# Patient Record
Sex: FEMALE
[De-identification: names, ages, dates, MRNs, and addresses within clinical notes are randomized per-mention and may not be internally consistent; named-entity substitution may affect disease eponyms.]

---

## 2018-05-31 ENCOUNTER — HOSPITAL ENCOUNTER (OUTPATIENT)
Dept: HOSPITAL 31 - C.ER | Age: 43
Setting detail: OBSERVATION
LOS: 1 days | Discharge: HOME | End: 2018-06-01
Attending: INTERNAL MEDICINE | Admitting: INTERNAL MEDICINE
Payer: COMMERCIAL

## 2018-05-31 VITALS — BODY MASS INDEX: 26.6 KG/M2

## 2018-05-31 DIAGNOSIS — K57.30: Primary | ICD-10-CM

## 2018-05-31 DIAGNOSIS — K59.00: ICD-10-CM
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
## 2018-05-31 DIAGNOSIS — G43.909: ICD-10-CM

## 2018-05-31 DIAGNOSIS — D12.4: ICD-10-CM

## 2018-05-31 DIAGNOSIS — K64.8: ICD-10-CM

## 2018-05-31 DIAGNOSIS — R20.0: ICD-10-CM

## 2018-05-31 DIAGNOSIS — Z87.11: ICD-10-CM

## 2018-05-31 DIAGNOSIS — T50.995A: ICD-10-CM

## 2018-05-31 DIAGNOSIS — R19.7: ICD-10-CM

## 2018-05-31 DIAGNOSIS — R63.4: ICD-10-CM

## 2018-05-31 DIAGNOSIS — R73.9: ICD-10-CM

## 2018-05-31 DIAGNOSIS — D12.8: ICD-10-CM

## 2018-05-31 DIAGNOSIS — Z98.51: ICD-10-CM

## 2018-05-31 DIAGNOSIS — K64.4: ICD-10-CM

## 2018-05-31 LAB
ALBUMIN SERPL-MCNC: 4.9 G/DL (ref 3.5–5)
ALBUMIN/GLOB SERPL: 1.4 {RATIO} (ref 1–2.1)
ALT SERPL-CCNC: 47 U/L (ref 9–52)
APTT BLD: 30 SECONDS (ref 21–34)
AST SERPL-CCNC: 33 U/L (ref 14–36)
BACTERIA #/AREA URNS HPF: (no result) /[HPF]
BASOPHILS # BLD AUTO: 0 K/UL (ref 0–0.2)
BASOPHILS NFR BLD: 0.2 % (ref 0–2)
BILIRUB UR-MCNC: NEGATIVE MG/DL
BUN SERPL-MCNC: 8 MG/DL (ref 7–17)
CALCIUM SERPL-MCNC: 9.3 MG/DL (ref 8.6–10.4)
CK MB SERPL-MCNC: < 0.22 NG/ML (ref 0–3.38)
EOSINOPHIL # BLD AUTO: 0 K/UL (ref 0–0.7)
EOSINOPHIL NFR BLD: 0.5 % (ref 0–4)
ERYTHROCYTE [DISTWIDTH] IN BLOOD BY AUTOMATED COUNT: 14.5 % (ref 11.5–14.5)
GFR NON-AFRICAN AMERICAN: > 60
GLUCOSE UR STRIP-MCNC: NORMAL MG/DL
HGB BLD-MCNC: 13.5 G/DL (ref 11–16)
INR PPP: 1.2
LEUKOCYTE ESTERASE UR-ACNC: (no result) LEU/UL
LIPASE: 73 U/L (ref 23–300)
LYMPHOCYTES # BLD AUTO: 1.3 K/UL (ref 1–4.3)
LYMPHOCYTES NFR BLD AUTO: 13.1 % (ref 20–40)
MCH RBC QN AUTO: 29.3 PG (ref 27–31)
MCHC RBC AUTO-ENTMCNC: 34.5 G/DL (ref 33–37)
MCV RBC AUTO: 84.9 FL (ref 81–99)
MONOCYTES # BLD: 0.7 K/UL (ref 0–0.8)
MONOCYTES NFR BLD: 7.2 % (ref 0–10)
NEUTROPHILS # BLD: 7.6 K/UL (ref 1.8–7)
NEUTROPHILS NFR BLD AUTO: 79 % (ref 50–75)
NRBC BLD AUTO-RTO: 0 % (ref 0–2)
PH UR STRIP: 7 [PH] (ref 5–8)
PLATELET # BLD: 319 K/UL (ref 130–400)
PMV BLD AUTO: 9 FL (ref 7.2–11.7)
PROT UR STRIP-MCNC: NEGATIVE MG/DL
PROTHROMBIN TIME: 13.2 SECONDS (ref 9.7–12.2)
RBC # BLD AUTO: 4.6 MIL/UL (ref 3.8–5.2)
RBC # UR STRIP: NEGATIVE /UL
SP GR UR STRIP: 1 (ref 1–1.03)
SQUAMOUS EPITHIAL: 10 /HPF (ref 0–5)
UROBILINOGEN UR-MCNC: NORMAL MG/DL (ref 0.2–1)
WBC # BLD AUTO: 9.7 K/UL (ref 4.8–10.8)

## 2018-05-31 PROCEDURE — 84703 CHORIONIC GONADOTROPIN ASSAY: CPT

## 2018-05-31 PROCEDURE — 89055 LEUKOCYTE ASSESSMENT FECAL: CPT

## 2018-05-31 PROCEDURE — 36415 COLL VENOUS BLD VENIPUNCTURE: CPT

## 2018-05-31 PROCEDURE — 88305 TISSUE EXAM BY PATHOLOGIST: CPT

## 2018-05-31 PROCEDURE — 70450 CT HEAD/BRAIN W/O DYE: CPT

## 2018-05-31 PROCEDURE — 85025 COMPLETE CBC W/AUTO DIFF WBC: CPT

## 2018-05-31 PROCEDURE — 84302 ASSAY OF SWEAT SODIUM: CPT

## 2018-05-31 PROCEDURE — 83690 ASSAY OF LIPASE: CPT

## 2018-05-31 PROCEDURE — 96374 THER/PROPH/DIAG INJ IV PUSH: CPT

## 2018-05-31 PROCEDURE — 85610 PROTHROMBIN TIME: CPT

## 2018-05-31 PROCEDURE — 85730 THROMBOPLASTIN TIME PARTIAL: CPT

## 2018-05-31 PROCEDURE — 81001 URINALYSIS AUTO W/SCOPE: CPT

## 2018-05-31 PROCEDURE — 84311 SPECTROPHOTOMETRY: CPT

## 2018-05-31 PROCEDURE — 82378 CARCINOEMBRYONIC ANTIGEN: CPT

## 2018-05-31 PROCEDURE — 86304 IMMUNOASSAY TUMOR CA 125: CPT

## 2018-05-31 PROCEDURE — 84484 ASSAY OF TROPONIN QUANT: CPT

## 2018-05-31 PROCEDURE — 82438 ASSAY OTHER FLUID CHLORIDES: CPT

## 2018-05-31 PROCEDURE — 93005 ELECTROCARDIOGRAM TRACING: CPT

## 2018-05-31 PROCEDURE — 80053 COMPREHEN METABOLIC PANEL: CPT

## 2018-05-31 PROCEDURE — 82150 ASSAY OF AMYLASE: CPT

## 2018-05-31 PROCEDURE — 86301 IMMUNOASSAY TUMOR CA 19-9: CPT

## 2018-05-31 PROCEDURE — 99285 EMERGENCY DEPT VISIT HI MDM: CPT

## 2018-05-31 PROCEDURE — 45385 COLONOSCOPY W/LESION REMOVAL: CPT

## 2018-05-31 PROCEDURE — 87045 FECES CULTURE AEROBIC BACT: CPT

## 2018-05-31 RX ADMIN — WATER SCH MLS/HR: 1 INJECTION INTRAMUSCULAR; INTRAVENOUS; SUBCUTANEOUS at 18:23

## 2018-05-31 NOTE — C.PDOC
History Of Present Illness





Patient is a 43 y/o female, with a Hx of migraines, who presents to the ED by 

referral of Dr. Tolliver for abdominal pain associated with alternating diarrhea 

and constipation for the last 1 month .  Patient describes pain as squeezing 

and notes mucous in stool. Admits pain causes decrease in appetite and 

sometimes worsens with eating. Admits to chills, denies any fever. Patient was 

seen at [facility] twice in the last few days, reporting to have had a negative 

abdominal CT. No other physical complaints at this time. 


Time Seen by Provider: 05/31/18 09:51


Chief Complaint (Nursing): Abdominal Pain


History Per: Patient


History/Exam Limitations: no limitations


Onset/Duration Of Symptoms: Days (1 month)


Current Symptoms Are (Timing): Still Present


Radiation Of Pain To:: None


Quality Of Discomfort: Other (squeezing)


Associated Symptoms: Chills, Diarrhea, Loss Of Appetite, Constipation.  denies: 

Fever


Recent travel outside of the [place]: No





Past Medical History


Reviewed: Historical Data, Nursing Documentation, Vital Signs


Vital Signs: 


 Last Vital Signs











Temp  98.2 F   06/01/18 15:30


 


Pulse  87   06/01/18 15:30


 


Resp  20   06/01/18 15:30


 


BP  138/85   06/01/18 15:30


 


Pulse Ox  100   06/03/18 12:31














- Medical History


PMH: Migraine


Surgical History: No Surg Hx


Family History: States: No Known Family Hx





- Social History


Hx Tobacco Use: No


Hx Alcohol Use: No


Hx Substance Use: No





Review Of Systems


Constitutional: Positive for: Chills.  Negative for: Fever


Cardiovascular: Negative for: Chest Pain


Respiratory: Negative for: Cough, Shortness of Breath


Gastrointestinal: Positive for: Abdominal Pain, Diarrhea, Constipation.  

Negative for: Vomiting


Genitourinary: Negative for: Dysuria


Skin: Negative for: Rash


Neurological: Negative for: Weakness, Numbness





Physical Exam





- Physical Exam


Appears: No Acute Distress, Other (well developed, well nourished)


Skin: No Rash


Head: Atraumatic, Normacephalic


Eye(s): bilateral: PERRL, EOMI


Oral Mucosa: Moist


Neck: Supple


Chest: Symmetrical


Cardiovascular: Rhythm Regular, No Murmur, Other (regular rate)


Respiratory: Normal Breath Sounds, No Rales, No Rhonchi, No Wheezing, Other (

clear to auscultation bilaterally )


Gastrointestinal/Abdominal: Bowel Sounds (normal), Soft, No Tenderness, No 

Distention, No Guarding, No Rebound


Back: No CVA Tenderness


Extremity: No Tenderness, No Swelling


Neurological/Psych: Oriented x3, Normal Speech, Normal Cognition, Other (no 

focal deficits)





ED Course And Treatment





- Laboratory Results


Result Diagrams: 


 05/31/18 10:36





 05/31/18 10:36


O2 Sat by Pulse Oximetry: 100


Progress Note: Blood work and UA ordered. Zofran and IV fluids administered.





Medical Decision Making


Medical Decision Making: 





pt with persistent abdominal pain x 1 month, with weight loss, unable to eat,  

discussed with Dr Salamanca, will admit for further evaluation.





Disposition


Discussed With .: Waqas Salamanca


Doctor Will See Patient In The: Hospital





- Disposition


Disposition: HOSPITALIZED


Disposition Time: 13:02 (                                                      

                                                                               

                    )


Condition: GOOD





- Clinical Impression


Clinical Impression: 


 Abdominal pain, Weight loss, non-intentional








- Scribe Statement


The provider has reviewed the documentation as recorded by the Scribjay Allen





All medical record entries made by the Scribjay were at my direction and 

personally dictated by me. I have reviewed the chart and agree that the record 

accurately reflects my personal performance of the history, physical exam, 

medical decision making, and the department course for this patient. I have 

also personally directed, reviewed, and agree with the discharge instructions 

and disposition.

## 2018-05-31 NOTE — CT
EXAM:

  CT Head Without Intravenous Contrast



EXAM DATE/TIME:

  Exam ordered 5/31/2018 7:07 PM



CLINICAL HISTORY:

  42 years old, female; Signs and symptoms; Other: Rt facial tingling and 

numbnes; Additional info: Tingling sensation on her right face



TECHNIQUE:

  Axial computed tomography images of the head/brain without intravenous 

contrast.  All CT scans at this facility use one or more dose reduction 

techniques, viz.: automated exposure control; ma/kV adjustment per patient size 

(including targeted exams where dose is matched to indication; i.e. head); or 

iterative reconstruction technique.



COMPARISON:

  No relevant prior studies available.



FINDINGS:

  Brain: No acute findings. No hemorrhage.  No significant white matter disease.

  Ventricles:  Unremarkable.  No ventriculomegaly.

  Bones/joints:  Unremarkable.  No acute fracture.

  Soft tissues:  Unremarkable.

  Sinuses:  Unremarkable as visualized.  No acute sinusitis.

  Mastoid air cells:  Unremarkable as visualized.  No mastoid effusion.



IMPRESSION:     

1. No acute findings.

## 2018-06-01 VITALS
RESPIRATION RATE: 20 BRPM | SYSTOLIC BLOOD PRESSURE: 138 MMHG | HEART RATE: 87 BPM | TEMPERATURE: 98.2 F | DIASTOLIC BLOOD PRESSURE: 85 MMHG

## 2018-06-01 LAB
AMYLASE SERPL-CCNC: 65 U/L (ref 30–110)
LIPASE: 61 U/L (ref 23–300)

## 2018-06-01 RX ADMIN — WATER SCH MLS/HR: 1 INJECTION INTRAMUSCULAR; INTRAVENOUS; SUBCUTANEOUS at 01:48

## 2018-06-01 RX ADMIN — WATER SCH MLS/HR: 1 INJECTION INTRAMUSCULAR; INTRAVENOUS; SUBCUTANEOUS at 11:50

## 2018-06-01 RX ADMIN — WATER SCH: 1 INJECTION INTRAMUSCULAR; INTRAVENOUS; SUBCUTANEOUS at 09:00

## 2018-06-01 NOTE — CON
DATE:  05/31/2018



From Dr. Carney to Dr. Waqas Salamanca.



I was called for GI consultation by the admitting medical team as well as

the ER staff and the patient's own family also.  The patient is seen and

fully examined on 05/31/2018.  The entire chart is reviewed including but

not limited to the most recent lab and radiology study results, current and

previous medication lists, current and previous medical events.  Case

discussed with the ER staff at length.



HISTORY OF PRESENT ILLNESS:  This is a 42-year-old female who was admitted

to the hospital due to severe, crampy, persistent abdominal pain, profuse

recurrent diarrhea, recurrent episodes of nausea, vomiting and dyspepsia

for which she was directed to go to the hospital for evaluation and workup.

The patient was also admitted for loss of appetite with excessive decrease

of body weight loss, generalized weakness and malaise but no chest pain,

palpitation, active bleeding, chills, or fever.  She had previously

negative abdominal CAT scan  in another medical institution as per her

statement.



PAST MEDICAL HISTORY:  Including, but not limited to,

1.  Migraine headache.

2.  Peptic ulcer disease.



FAMILY HISTORY:  Unknown.



SOCIAL HISTORY:  Denies any recent history of cigarette smoking or alcohol

intake.



CURRENT MEDICATION:  Medication list post admission is seen.



ALLERGIES TO MEDICATIONS:  Unclear.



LABORATORY DATA:  Post admission lab workup showed normal CBC with blood

glucose level 108.



PHYSICAL EXAMINATION:

GENERAL:  A 42-year-old female complaining of severe crampy abdominal pain

with much more frequent bowel movement of thin liquid mucoid fecal

material.

VITAL SIGNS:  The patient is afebrile with pulse of 104, respiratory rate

of 20 to 22, blood pressure 128/68.

HEENT:  Showed dry oral mucoid membrane, nonicteric sclerae.

LYMPH  NODES:  No lymphadenitis or lymphadenopathy.

LUNGS:  Clear.  Breathing sounds are present bilaterally.

HEART:  Positive S1 and S2 with increased rate.

ABDOMEN:  With generalized moderate to severe tenderness and slight

distention.  Bowel sound are excessively hyperactive.  No mass or

organomegaly.  No rebound tenderness or guarding.  _____.

RECTAL:  The patient refused.

EXTREMITIES:  Without significant clubbing, cyanosis or edema.

NEUROLOGIC:  No reported new neurological deficits, sensory or motor. 

Peripheral pulses are present bilaterally.



IMPRESSION:

1.  Profuse diarrhea for the last several days of unclear etiology, was not

fully evaluated in another medical institution.

2.  Re-exacerbation of peptic ulcer disease.

3.  Migraine headache by history.

4.  New episode of hyperglycemia of unclear etiology.



SUGGESTIONS:

1.  Agree with your plan.

2.  Flagyl IV after complete _____.

3.  Cancer markers including CEA.

4.  Proton pump inhibitors.

5.  Endoscopic evaluation of lower GI tract due to the patient's recurrent

profuse diarrhea recently.

6.  Further recommendation to follow.



Thank you for letting me participate in your patient's case management.





__________________________________________

Paris Carney MD





DD:  05/31/2018 23:57:03

DT:  06/01/2018 2:39:07

Job # [id number]

## 2018-06-01 NOTE — CP.PCM.CON
History of Present Illness





- History of Present Illness


History of Present Illness: 





Mrs. Marc is a 42-year-old woman who was preparing for colonoscopy with 

bowel prep and had a reaction with facial tingling, heat, that extended to the 

neck and caused chest tightening.  Neurology was consulted to assist.  The 

patient was asymptomatic when I saw her.





Review of Systems





- Review of Systems


All systems: reviewed and no additional remarkable complaints except





Past Patient History





- Past Social History


Smoking Status: Never Smoked





- NEUROLOGICAL


Hx Migraine: Yes





- PSYCHIATRIC


Hx Substance Use: No





- SURGICAL HISTORY


Hx Surgeries: Yes


Hx Tubal Ligation: Yes (2010)





- ANESTHESIA


Hx Anesthesia: Yes





Meds


Home Medications: 


 Home Medication List











 Medication  Instructions  Recorded  Confirmed  Type


 


Ciprofloxacin [Cipro] 500 mg PO BID #28 tab 06/01/18  Rx


 


Hydrocortisone/Pramoxine [Analpram 28.4 gm RC BID #1 cream.appl 06/01/18  Rx





Hc 1% Cream]    


 


Metoclopramide [Reglan] 10 mg PO BID PRN #14 06/01/18 05/31/18 Rx











Allergies/Adverse Reactions: 


 Allergies











Allergy/AdvReac Type Severity Reaction Status Date / Time


 


No Known Allergies Allergy   Verified 05/31/18 09:37














Physical Exam





- Constitutional


Appears: Well





- Head Exam


Head Exam: ATRAUMATIC, NORMAL INSPECTION, NORMOCEPHALIC





- Eye Exam


Eye Exam: EOMI, Normal appearance, PERRL





- Neurological Exam


Neurological exam: Alert, CN II-XII Intact, Normal Gait, Oriented x3, Reflexes 

Normal





Results





- Vital Signs


Recent Vital Signs: 


 Last Vital Signs











Temp  98.2 F   06/01/18 15:30


 


Pulse  87   06/01/18 15:30


 


Resp  20   06/01/18 15:30


 


BP  138/85   06/01/18 15:30


 


Pulse Ox  97   06/01/18 15:30














- Labs


Result Diagrams: 


 05/31/18 10:36





 05/31/18 10:36


Labs: 


 Laboratory Results - last 24 hr











  05/31/18 05/31/18 06/01/18





  20:46 21:11 06:56


 


Total Creatine Kinase   61 


 


CK-MB (Mass)   < 0.22 


 


Troponin I   < 0.0120 


 


Amylase   


 


Lipase   


 


Carcinoembryonic Ag   


 


CA 19-9 Antigen   


 


 Antigen   


 


Urine HCG, Qual    Negative


 


Stool Leukocytes, Qual  Negative  














  06/01/18





  11:26


 


Total Creatine Kinase 


 


CK-MB (Mass) 


 


Troponin I 


 


Amylase  65


 


Lipase  61


 


Carcinoembryonic Ag  0.5


 


CA 19-9 Antigen  < 1.4


 


 Antigen  22.9


 


Urine HCG, Qual 


 


Stool Leukocytes, Qual 














Assessment & Plan


(1) Allergic reaction


Assessment and Plan: 


The patient should avoid this brand of Golytely in the future.  CT scan of the 

head was normal.  No further neurological work-up needed.





Thank you. 


Status: Acute   Priority: High

## 2018-06-01 NOTE — CP.PCM.PN
Subjective





- Date & Time of Evaluation


Date of Evaluation: 06/01/18


Time of Evaluation: 09:09





- Subjective


Subjective: 





Progress Note for Dr. Salamanca





Patient seen and examined at bedside. Overnight patient complains of right 

sided facial numbness with no other neurological symptoms. Patient states this 

happened previously when she was having migraine attacks. CT head was ordered 

which showed no acute abnormalities. Patients states her abdominal pain and 

diarrhea improved since the colonoscopy prep "cleaned her out". Patient reports 

to have dizziness intermittently. She currently denies having headache, fever, 

chills, chest pain, nausea, or vomiting.





Objective





- Vital Signs/Intake and Output


Vital Signs (last 24 hours): 


 











Temp Pulse Resp BP Pulse Ox


 


 98.2 F   76   20   125/82   100 


 


 06/01/18 08:18  06/01/18 08:18  06/01/18 08:18  06/01/18 08:18  06/01/18 08:18








Intake and Output: 


 











 06/01/18 06/01/18





 06:59 18:59


 


Intake Total  400


 


Balance  400














- Medications


Medications: 


 Current Medications





Ciprofloxacin (Cipro)  500 mg PO BID JOHNNIE


   PRN Reason: Protocol


Metronidazole (Flagyl)  500 mg in 100 mls @ 100 mls/hr IVPB Q8H JOHNNIE


   PRN Reason: Protocol


   Last Admin: 06/01/18 01:48 Dose:  100 mls/hr


Metoclopramide HCl (Reglan)  5 mg IVP Q6H [facility]


   Last Admin: 06/01/18 01:48 Dose:  5 mg


Pantoprazole Sodium (Protonix Inj)  40 mg IVP DAILY [facility]


   Last Admin: 05/31/18 14:22 Dose:  40 mg


Pneumococcal Polyvalent Vaccine (Pneumovax 23 Vaccine)  0.5 ml IM .ONCE ONE


   Stop: 06/01/18 10:01











- Labs


Labs: 


 





 05/31/18 10:36 





 05/31/18 10:36 





 











PT  13.2 SECONDS (9.7-12.2)  H  05/31/18  16:23    


 


INR  1.2   05/31/18  16:23    


 


APTT  30 SECONDS (21-34)   05/31/18  16:23    














- Constitutional


Appears: Non-toxic, No Acute Distress





- Head Exam


Head Exam: ATRAUMATIC, NORMOCEPHALIC





- Eye Exam


Eye Exam: EOMI, Normal appearance





- ENT Exam


ENT Exam: Mucous Membranes Moist





- Respiratory Exam


Respiratory Exam: Clear to Ausculation Bilateral, NORMAL BREATHING PATTERN.  

absent: Rhonchi, Wheezes, Respiratory Distress





- Cardiovascular Exam


Cardiovascular Exam: REGULAR RHYTHM, +S1, +S2.  absent: Murmur





- GI/Abdominal Exam


GI & Abdominal Exam: Soft, Tenderness (mild diffused tenderness), Normal Bowel 

Sounds





- Extremities Exam


Extremities Exam: Full ROM.  absent: Joint Swelling





Assessment and Plan





- Assessment and Plan (Free Text)


Assessment: 





Diverticulosis


-Endoscopy this morning shows moderate diverticulosis in the sigmoid, descending

, amending and transverse colon with no bleeds


-Polyps resected, f/u pathology


-Clear liquid diet


-Cipro 500mg BID


-Follow further GI recommendations





History of migraines


-Continue home medications





Right sided facial numbness, resolved


-Neurology and cardiology consulted


-Likely allergic reaction to colonoscopy prep solution





Case discussed with attending physician


Patient is medically stable to be discharge per Dr. Salamanca

## 2018-06-02 NOTE — CARD
--------------- APPROVED REPORT --------------





EKG Measurement

Heart [id number]

MI [id number]

[id number]

[id number]

[id number]



<Conclusion>

Normal sinus rhythm

Normal ECG

## 2018-06-03 VITALS — OXYGEN SATURATION: 100 %

## 2018-06-04 NOTE — HP
HISTORY OF PRESENT ILLNESS:  The patient is a 42-year-old female, admitted

to the hospital with chief complaint of  weakness, fatigue, tiredness,

abdominal pain, constipation.  The patient came to the ER, advised

admission.  The patient is a nonsmoker, nondrinker.



PHYSICAL EXAMINATION:

GENERAL:  The patient is awake, alert, and oriented.

VITAL SIGNS:  Temperature 98, pulse 90.

HEENT:  Within normal limits.

NECK:  Supple.

CHEST:  Symmetrical.

HEART:  Regular.

ABDOMEN:  Soft.

EXTREMITIES:  No edema.



ASSESSMENT AND PLAN:  The patient suffers from abdominal pain, guaiac

positive stool _____.  Patient to get bedrest, supportive care,

antibiotics.





__________________________________________

Waqas Salamanca MD





DD:  06/01/2018 11:07:54

DT:  06/01/2018 12:57:02

Job # [id number]